# Patient Record
Sex: FEMALE | Race: WHITE | NOT HISPANIC OR LATINO | URBAN - METROPOLITAN AREA
[De-identification: names, ages, dates, MRNs, and addresses within clinical notes are randomized per-mention and may not be internally consistent; named-entity substitution may affect disease eponyms.]

---

## 2020-07-25 ENCOUNTER — TELEPHONE ENCOUNTER (OUTPATIENT)
Dept: URBAN - METROPOLITAN AREA CLINIC 13 | Facility: CLINIC | Age: 80
End: 2020-07-25

## 2020-07-25 RX ORDER — PANCRELIPASE 24000; 76000; 120000 [USP'U]/1; [USP'U]/1; [USP'U]/1
TAKE 1 CAPSULE 3 TIMES DAILY WITH MEALS CAPSULE, DELAYED RELEASE PELLETS ORAL
Qty: 90 | Refills: 5 | OUTPATIENT
Start: 2018-05-31 | End: 2018-07-02

## 2020-07-25 RX ORDER — MIRTAZAPINE 15 MG/1
TAKE 1 TABLET AT BEDTIME TABLET, FILM COATED ORAL
Qty: 30 | Refills: 6 | OUTPATIENT
Start: 2017-07-28 | End: 2018-02-19

## 2020-07-25 RX ORDER — DICYCLOMINE HYDROCHLORIDE 10 MG/1
TAKE 1 CAPSULE 3 TIMES DAILY PRN CAPSULE ORAL
Qty: 270 | Refills: 2 | OUTPATIENT
Start: 2019-04-03 | End: 2019-08-20

## 2020-07-25 RX ORDER — LINACLOTIDE 145 UG/1
TAKE 1 CAPSULE DAILY CAPSULE, GELATIN COATED ORAL
Qty: 30 | Refills: 2 | OUTPATIENT

## 2020-07-25 RX ORDER — METOCLOPRAMIDE 10 MG/1
TAKE 1 TABLET 30 MINUTES BEFORE MEALS AND AT BEDTIME TABLET ORAL
Qty: 120 | Refills: 2 | OUTPATIENT
Start: 2018-07-02 | End: 2018-08-15

## 2020-07-25 RX ORDER — LINACLOTIDE 145 UG/1
TAKE 1 CAPSULE DAILY EMPTY STOMACH, 30 MINUTES BEFORE BREAKFAST CAPSULE, GELATIN COATED ORAL
Qty: 30 | Refills: 5 | OUTPATIENT
Start: 2018-01-15 | End: 2018-02-19

## 2020-07-25 RX ORDER — ONDANSETRON 4 MG/1
1 TABLET ODT EVERY 4-6 HOURS AS NEEDED FOR NAUSEA TABLET, ORALLY DISINTEGRATING ORAL
Qty: 40 | Refills: 2 | OUTPATIENT
Start: 2018-07-02 | End: 2018-08-15

## 2020-07-25 RX ORDER — CARVEDILOL 3.12 MG/1
TAKE 1 TABLET TWICE DAILY WITH MEALS TABLET, FILM COATED ORAL
Qty: 180 | Refills: 3 | OUTPATIENT
End: 2019-02-13

## 2020-07-25 RX ORDER — POLYETHYLENE GLYCOL 3350, SODIUM CHLORIDE, SODIUM BICARBONATE AND POTASSIUM CHLORIDE WITH LEMON FLAVOR 420; 11.2; 5.72; 1.48 G/4L; G/4L; G/4L; G/4L
USE AS DIRECTED POWDER, FOR SOLUTION ORAL
Qty: 1 | Refills: 0 | OUTPATIENT
Start: 2017-09-26 | End: 2017-11-15

## 2020-07-25 RX ORDER — DIAZEPAM 5 MG/1
TAKE 1 TABLET OTHER PRN TAKE 1-2 TABLETS BY MOUTH AS NEEDED FOR PRE-TEST TABLET ORAL
Qty: 5 | Refills: 0 | OUTPATIENT
Start: 2018-03-13 | End: 2018-03-23

## 2020-07-25 RX ORDER — POLYETHYLENE GLYCOL 3350, SODIUM CHLORIDE, SODIUM BICARBONATE AND POTASSIUM CHLORIDE WITH LEMON FLAVOR 420; 11.2; 5.72; 1.48 G/4L; G/4L; G/4L; G/4L
TAKE 1/2 GALLON AT 5:00 PM DAY BEFORE PROCEDURE, TAKE SECOND 1/2 OF GALLON 6 HRS PRIOR TO PROCEDURE POWDER, FOR SOLUTION ORAL
Qty: 1 | Refills: 0 | OUTPATIENT
Start: 2018-05-30 | End: 2018-07-02

## 2020-07-25 RX ORDER — ASPIRIN 81 MG/1
TAKE 1 TABLET EVERY 2 DAYS TABLET, DELAYED RELEASE ORAL
Refills: 0 | OUTPATIENT
End: 2019-08-20

## 2020-07-25 RX ORDER — KETOCONAZOLE 20 MG/G
APPLY SPARINGLY TO AFFECTED AREA(S) ONCE DAILY CREAM TOPICAL
Refills: 0 | OUTPATIENT
Start: 2019-10-22 | End: 2019-12-06

## 2020-07-25 RX ORDER — PANTOPRAZOLE SODIUM 40 MG/1
TAKE ONE TABLET BY MOUTH ONE TIME DAILY 30 MINUTES BEFORE BREAKFAST TABLET, DELAYED RELEASE ORAL
Qty: 30 | Refills: 11 | OUTPATIENT
Start: 2018-07-02 | End: 2019-02-13

## 2020-07-25 RX ORDER — TRIAMCINOLONE ACETONIDE 1 MG/G
APPLY SPARINGLY TO AFFECTED AREA(S) 4 TIMES A DAY OINTMENT TOPICAL
Refills: 0 | OUTPATIENT
Start: 2019-04-10 | End: 2019-08-20

## 2020-07-25 RX ORDER — ATORVASTATIN CALCIUM 10 MG/1
TAKE 1 TABLET DAILY TABLET, FILM COATED ORAL
Refills: 0 | OUTPATIENT
End: 2019-12-05

## 2020-07-26 ENCOUNTER — TELEPHONE ENCOUNTER (OUTPATIENT)
Dept: URBAN - METROPOLITAN AREA CLINIC 13 | Facility: CLINIC | Age: 80
End: 2020-07-26

## 2020-07-26 RX ORDER — POLYETHYLENE GLYCOL-3350 AND ELECTROLYTES WITH FLAVOR PACK 240; 5.84; 2.98; 6.72; 22.72 G/278.26G; G/278.26G; G/278.26G; G/278.26G; G/278.26G
TAKE 1/2 GALLON AT 5 P.M. THE DAY BEFORE PROCEDURE, TAKE SECOND 1/2 GA POWDER, FOR SOLUTION ORAL
Qty: 4000 | Refills: 0 | Status: ACTIVE | COMMUNITY
Start: 2018-05-30

## 2020-07-26 RX ORDER — BRIMONIDINE TARTRATE, TIMOLOL MALEATE 2; 5 MG/ML; MG/ML
INSTILL 1 DROP TWICE DAILY SOLUTION/ DROPS OPHTHALMIC
Refills: 0 | Status: ACTIVE | COMMUNITY
Start: 2019-02-11

## 2020-07-26 RX ORDER — BETAMETHASONE VALERATE 1 MG/G
CREAM TOPICAL
Qty: 45 | Refills: 0 | Status: ACTIVE | COMMUNITY
Start: 2018-12-31

## 2020-07-26 RX ORDER — ATORVASTATIN CALCIUM 80 MG/1
TABLET, FILM COATED ORAL
Qty: 90 | Refills: 0 | Status: ACTIVE | COMMUNITY
Start: 2018-04-16

## 2020-07-26 RX ORDER — BIMATOPROST 0.1 MG/ML
SOLUTION/ DROPS OPHTHALMIC
Qty: 8 | Refills: 0 | Status: ACTIVE | COMMUNITY
Start: 2018-06-12

## 2020-07-26 RX ORDER — BUSPIRONE HYDROCHLORIDE 10 MG/1
TABLET ORAL
Qty: 180 | Refills: 0 | Status: ACTIVE | COMMUNITY
Start: 2018-10-04

## 2020-07-26 RX ORDER — LAMOTRIGINE 150 MG/1
TAKE 1 TABLET TWICE DAILY TABLET ORAL
Refills: 0 | Status: ACTIVE | COMMUNITY

## 2020-07-26 RX ORDER — PREDNISONE 10 MG/1
TAKE FIVE TABLETS BY MOUTH ONE TIME DAILY TABLET ORAL
Qty: 100 | Refills: 0 | Status: ACTIVE | COMMUNITY
Start: 2019-03-13

## 2020-07-26 RX ORDER — ALPRAZOLAM 0.25 MG/1
TABLET ORAL
Qty: 30 | Refills: 0 | Status: ACTIVE | COMMUNITY
Start: 2018-03-20

## 2020-07-26 RX ORDER — PANTOPRAZOLE SODIUM 40 MG/1
TAKE 1 TABLET DAILY PRN TABLET, DELAYED RELEASE ORAL
Refills: 3 | Status: ACTIVE | COMMUNITY

## 2020-07-26 RX ORDER — ATORVASTATIN CALCIUM 80 MG/1
TABLET, FILM COATED ORAL
Qty: 90 | Refills: 0 | Status: ACTIVE | COMMUNITY
Start: 2018-07-20

## 2020-07-26 RX ORDER — BUSPIRONE HYDROCHLORIDE 10 MG/1
TABLET ORAL
Qty: 180 | Refills: 0 | Status: ACTIVE | COMMUNITY
Start: 2019-01-18

## 2020-07-26 RX ORDER — CEPHALEXIN 250 MG/1
TAKE ONE CAPSULE BY MOUTH TWICE A DAY CAPSULE ORAL
Qty: 6 | Refills: 0 | Status: ACTIVE | COMMUNITY
Start: 2018-07-03

## 2020-07-26 RX ORDER — IBUPROFEN 800 MG
TAKE 1 TABLET DAILY TABLET ORAL
Refills: 0 | Status: ACTIVE | COMMUNITY

## 2020-07-26 RX ORDER — BIMATOPROST 0.1 MG/ML
SOLUTION/ DROPS OPHTHALMIC
Qty: 8 | Refills: 0 | Status: ACTIVE | COMMUNITY
Start: 2018-02-21

## 2020-07-26 RX ORDER — BIMATOPROST 0.1 MG/ML
INSTILL ONE DROP INTO EACH EYE AT BEDTIME SOLUTION/ DROPS OPHTHALMIC
Qty: 8 | Refills: 0 | Status: ACTIVE | COMMUNITY
Start: 2018-01-08

## 2020-07-26 RX ORDER — BUSPIRONE HYDROCHLORIDE 10 MG/1
TABLET ORAL
Qty: 180 | Refills: 0 | Status: ACTIVE | COMMUNITY
Start: 2019-05-09

## 2020-07-26 RX ORDER — ONDANSETRON 4 MG/1
1 TABLET ODT EVERY 4-6 HOURS AS NEEDED FOR NAUSEA TABLET, ORALLY DISINTEGRATING ORAL
Qty: 40 | Refills: 2 | Status: ACTIVE | COMMUNITY
Start: 2019-05-13

## 2020-07-26 RX ORDER — ONDANSETRON HYDROCHLORIDE 4 MG/1
TABLET, FILM COATED ORAL
Qty: 120 | Refills: 0 | Status: ACTIVE | COMMUNITY
Start: 2018-03-16

## 2020-07-26 RX ORDER — LATANOPROSTENE BUNOD 0.24 MG/ML
INSTILL A DROP IN EACH EYE AT BEDTIME SOLUTION/ DROPS OPHTHALMIC
Qty: 8 | Refills: 0 | Status: ACTIVE | COMMUNITY
Start: 2019-05-28

## 2020-07-26 RX ORDER — ATORVASTATIN CALCIUM 80 MG/1
TAKE 1 TABLET DAILY TABLET, FILM COATED ORAL
Refills: 0 | Status: ACTIVE | COMMUNITY
Start: 2019-01-03

## 2020-07-26 RX ORDER — BRIMONIDINE TARTRATE, TIMOLOL MALEATE 2; 5 MG/ML; MG/ML
SOLUTION/ DROPS OPHTHALMIC
Qty: 15 | Refills: 0 | Status: ACTIVE | COMMUNITY
Start: 2018-03-17

## 2020-07-26 RX ORDER — ATORVASTATIN CALCIUM 80 MG/1
TAKE ONE TABLET BY MOUTH ONE TIME DAILY TABLET, FILM COATED ORAL
Qty: 90 | Refills: 0 | Status: ACTIVE | COMMUNITY
Start: 2017-12-18

## 2020-07-26 RX ORDER — BETAMETHASONE VALERATE 1 MG/G
CREAM TOPICAL
Qty: 45 | Refills: 0 | Status: ACTIVE | COMMUNITY
Start: 2018-10-24

## 2020-07-26 RX ORDER — BUSPIRONE HYDROCHLORIDE 10 MG/1
TABLET ORAL
Qty: 180 | Refills: 0 | Status: ACTIVE | COMMUNITY
Start: 2018-02-23

## 2020-08-21 ENCOUNTER — OFFICE VISIT (OUTPATIENT)
Dept: URBAN - METROPOLITAN AREA CLINIC 72 | Facility: CLINIC | Age: 80
End: 2020-08-21
Payer: MEDICARE

## 2020-08-21 ENCOUNTER — WEB ENCOUNTER (OUTPATIENT)
Dept: URBAN - METROPOLITAN AREA CLINIC 72 | Facility: CLINIC | Age: 80
End: 2020-08-21

## 2020-08-21 VITALS
DIASTOLIC BLOOD PRESSURE: 78 MMHG | HEART RATE: 80 BPM | SYSTOLIC BLOOD PRESSURE: 157 MMHG | WEIGHT: 119 LBS | HEIGHT: 63 IN | TEMPERATURE: 98.4 F | BODY MASS INDEX: 21.09 KG/M2

## 2020-08-21 DIAGNOSIS — K58.2 IRRITABLE BOWEL SYNDROME WITH BOTH CONSTIPATION AND DIARRHEA: ICD-10-CM

## 2020-08-21 PROCEDURE — 1036F TOBACCO NON-USER: CPT | Performed by: INTERNAL MEDICINE

## 2020-08-21 PROCEDURE — 99213 OFFICE O/P EST LOW 20 MIN: CPT | Performed by: INTERNAL MEDICINE

## 2020-08-21 PROCEDURE — G8427 DOCREV CUR MEDS BY ELIG CLIN: HCPCS | Performed by: INTERNAL MEDICINE

## 2020-08-21 PROCEDURE — G8418 CALC BMI BLW LOW PARAM F/U: HCPCS | Performed by: INTERNAL MEDICINE

## 2020-08-21 PROCEDURE — G9903 PT SCRN TBCO ID AS NON USER: HCPCS | Performed by: INTERNAL MEDICINE

## 2020-08-21 RX ORDER — LAMOTRIGINE 150 MG/1
TAKE 1 TABLET TWICE DAILY TABLET ORAL
Refills: 0 | Status: ACTIVE | COMMUNITY

## 2020-08-21 RX ORDER — PREDNISONE 10 MG/1
TAKE FIVE TABLETS BY MOUTH ONE TIME DAILY TABLET ORAL
Qty: 100 | Refills: 0 | Status: ACTIVE | COMMUNITY
Start: 2019-03-13

## 2020-08-21 RX ORDER — BRIMONIDINE TARTRATE, TIMOLOL MALEATE 2; 5 MG/ML; MG/ML
SOLUTION/ DROPS OPHTHALMIC
Qty: 15 | Refills: 0 | Status: ACTIVE | COMMUNITY
Start: 2018-03-17

## 2020-08-21 RX ORDER — ONDANSETRON HYDROCHLORIDE 4 MG/1
TABLET, FILM COATED ORAL
Qty: 120 | Refills: 0 | Status: ACTIVE | COMMUNITY
Start: 2018-03-16

## 2020-08-21 RX ORDER — BETAMETHASONE VALERATE 1 MG/G
CREAM TOPICAL
Qty: 45 | Refills: 0 | Status: ACTIVE | COMMUNITY
Start: 2018-10-24

## 2020-08-21 RX ORDER — CEPHALEXIN 250 MG/1
TAKE ONE CAPSULE BY MOUTH TWICE A DAY CAPSULE ORAL
Qty: 6 | Refills: 0 | Status: ACTIVE | COMMUNITY
Start: 2018-07-03

## 2020-08-21 RX ORDER — BIMATOPROST 0.1 MG/ML
INSTILL ONE DROP INTO EACH EYE AT BEDTIME SOLUTION/ DROPS OPHTHALMIC
Qty: 8 | Refills: 0 | Status: ACTIVE | COMMUNITY
Start: 2018-01-08

## 2020-08-21 RX ORDER — LATANOPROSTENE BUNOD 0.24 MG/ML
INSTILL A DROP IN EACH EYE AT BEDTIME SOLUTION/ DROPS OPHTHALMIC
Qty: 8 | Refills: 0 | Status: ACTIVE | COMMUNITY
Start: 2019-05-28

## 2020-08-21 RX ORDER — PANTOPRAZOLE SODIUM 40 MG/1
TAKE 1 TABLET DAILY PRN TABLET, DELAYED RELEASE ORAL
Refills: 3 | Status: ACTIVE | COMMUNITY

## 2020-08-21 RX ORDER — IBUPROFEN 800 MG
TAKE 1 TABLET DAILY TABLET ORAL
Refills: 0 | Status: ACTIVE | COMMUNITY

## 2020-08-21 RX ORDER — ATORVASTATIN CALCIUM 80 MG/1
TAKE ONE TABLET BY MOUTH ONE TIME DAILY TABLET, FILM COATED ORAL
Qty: 90 | Refills: 0 | Status: ACTIVE | COMMUNITY
Start: 2017-12-18

## 2020-08-21 RX ORDER — ONDANSETRON 4 MG/1
1 TABLET ODT EVERY 4-6 HOURS AS NEEDED FOR NAUSEA TABLET, ORALLY DISINTEGRATING ORAL
Qty: 40 | Refills: 2 | Status: ACTIVE | COMMUNITY
Start: 2019-05-13

## 2020-08-21 RX ORDER — ALPRAZOLAM 0.25 MG/1
TABLET ORAL
Qty: 30 | Refills: 0 | Status: ACTIVE | COMMUNITY
Start: 2018-03-20

## 2020-08-21 RX ORDER — BUSPIRONE HYDROCHLORIDE 10 MG/1
TABLET ORAL
Qty: 180 | Refills: 0 | Status: ACTIVE | COMMUNITY
Start: 2018-02-23

## 2020-08-21 RX ORDER — POLYETHYLENE GLYCOL-3350 AND ELECTROLYTES WITH FLAVOR PACK 240; 5.84; 2.98; 6.72; 22.72 G/278.26G; G/278.26G; G/278.26G; G/278.26G; G/278.26G
TAKE 1/2 GALLON AT 5 P.M. THE DAY BEFORE PROCEDURE, TAKE SECOND 1/2 GA POWDER, FOR SOLUTION ORAL
Qty: 4000 | Refills: 0 | Status: ACTIVE | COMMUNITY
Start: 2018-05-30

## 2020-08-21 NOTE — HPI-TODAY'S VISIT:
Ms. Kimball returns for follow-up.  Recall she is an 80-year-old female who we have been seeing for irritable bowel syndrome.  Her past medical history is notable for irritable bowel syndrome with both constipation and diarrhea as well as functional dyspepsia, anemia, squamous cell carcinoma on chemotherapy with Dr. Gong. She was last seen in our office December 6, 2019.  We placed her on a low fermentable carbohydrates in diet to help with symptom management.  Last visit she endorsed bloating and diarrhea in relation to dairy and fatty foods.  She was utilizing MiraLAX as needed for her episodes of constipation.  She also was taking Zofran as needed for nausea.  We advised that she reintroduce some of the food she had been omitting from her diet and also recommended a six-month follow-up.  She now returns.  She is still having issues with nausea, but the zofran is helping

## 2020-09-02 ENCOUNTER — TELEPHONE ENCOUNTER (OUTPATIENT)
Dept: URBAN - METROPOLITAN AREA CLINIC 72 | Facility: CLINIC | Age: 80
End: 2020-09-02

## 2021-01-12 ENCOUNTER — OFFICE VISIT (OUTPATIENT)
Dept: URBAN - METROPOLITAN AREA CLINIC 72 | Facility: CLINIC | Age: 81
End: 2021-01-12
Payer: MEDICARE

## 2021-01-12 ENCOUNTER — WEB ENCOUNTER (OUTPATIENT)
Dept: URBAN - METROPOLITAN AREA CLINIC 72 | Facility: CLINIC | Age: 81
End: 2021-01-12

## 2021-01-12 VITALS
DIASTOLIC BLOOD PRESSURE: 71 MMHG | BODY MASS INDEX: 20.91 KG/M2 | SYSTOLIC BLOOD PRESSURE: 158 MMHG | HEART RATE: 59 BPM | WEIGHT: 118 LBS | TEMPERATURE: 98.4 F | HEIGHT: 63 IN

## 2021-01-12 DIAGNOSIS — K58.2 IRRITABLE BOWEL SYNDROME WITH BOTH CONSTIPATION AND DIARRHEA: ICD-10-CM

## 2021-01-12 PROCEDURE — G8482 FLU IMMUNIZE ORDER/ADMIN: HCPCS | Performed by: INTERNAL MEDICINE

## 2021-01-12 PROCEDURE — G8427 DOCREV CUR MEDS BY ELIG CLIN: HCPCS | Performed by: INTERNAL MEDICINE

## 2021-01-12 PROCEDURE — 1036F TOBACCO NON-USER: CPT | Performed by: INTERNAL MEDICINE

## 2021-01-12 PROCEDURE — G8420 CALC BMI NORM PARAMETERS: HCPCS | Performed by: INTERNAL MEDICINE

## 2021-01-12 PROCEDURE — 99213 OFFICE O/P EST LOW 20 MIN: CPT | Performed by: INTERNAL MEDICINE

## 2021-01-12 RX ORDER — BIMATOPROST 0.1 MG/ML
INSTILL ONE DROP INTO EACH EYE AT BEDTIME SOLUTION/ DROPS OPHTHALMIC
Qty: 8 | Refills: 0 | Status: ON HOLD | COMMUNITY
Start: 2018-01-08

## 2021-01-12 RX ORDER — ONDANSETRON HYDROCHLORIDE 4 MG/1
TABLET, FILM COATED ORAL
Qty: 120 | Refills: 0 | Status: ACTIVE | COMMUNITY
Start: 2018-03-16

## 2021-01-12 RX ORDER — IBUPROFEN 800 MG
TAKE 1 TABLET DAILY TABLET ORAL
Refills: 0 | Status: ACTIVE | COMMUNITY

## 2021-01-12 RX ORDER — GINGER ROOT/GINGER ROOT EXT 262.5 MG
1 TABLET WITH A MEAL CAPSULE ORAL ONCE A DAY
Status: ACTIVE | COMMUNITY

## 2021-01-12 RX ORDER — POLYETHYLENE GLYCOL-3350 AND ELECTROLYTES WITH FLAVOR PACK 240; 5.84; 2.98; 6.72; 22.72 G/278.26G; G/278.26G; G/278.26G; G/278.26G; G/278.26G
TAKE 1/2 GALLON AT 5 P.M. THE DAY BEFORE PROCEDURE, TAKE SECOND 1/2 GA POWDER, FOR SOLUTION ORAL
Qty: 4000 | Refills: 0 | Status: ON HOLD | COMMUNITY
Start: 2018-05-30

## 2021-01-12 RX ORDER — BRIMONIDINE TARTRATE, TIMOLOL MALEATE 2; 5 MG/ML; MG/ML
SOLUTION/ DROPS OPHTHALMIC
Qty: 15 | Refills: 0 | Status: ACTIVE | COMMUNITY
Start: 2018-03-17

## 2021-01-12 RX ORDER — BETAMETHASONE VALERATE 1 MG/G
CREAM TOPICAL
Qty: 45 | Refills: 0 | Status: ACTIVE | COMMUNITY
Start: 2018-10-24

## 2021-01-12 RX ORDER — ALPRAZOLAM 0.25 MG/1
TABLET ORAL
Qty: 30 | Refills: 0 | Status: ON HOLD | COMMUNITY
Start: 2018-03-20

## 2021-01-12 RX ORDER — PREDNISONE 10 MG/1
TAKE FIVE TABLETS BY MOUTH ONE TIME DAILY TABLET ORAL
Qty: 100 | Refills: 0 | Status: ON HOLD | COMMUNITY
Start: 2019-03-13

## 2021-01-12 RX ORDER — LAMOTRIGINE 150 MG/1
TAKE 1 TABLET TWICE DAILY TABLET ORAL
Refills: 0 | Status: ACTIVE | COMMUNITY

## 2021-01-12 RX ORDER — CEPHALEXIN 250 MG/1
TAKE ONE CAPSULE BY MOUTH TWICE A DAY CAPSULE ORAL
Qty: 6 | Refills: 0 | Status: ON HOLD | COMMUNITY
Start: 2018-07-03

## 2021-01-12 RX ORDER — ONDANSETRON 4 MG/1
1 TABLET ODT EVERY 4-6 HOURS AS NEEDED FOR NAUSEA TABLET, ORALLY DISINTEGRATING ORAL
Qty: 40 | Refills: 2 | Status: ON HOLD | COMMUNITY
Start: 2019-05-13

## 2021-01-12 RX ORDER — BUSPIRONE HYDROCHLORIDE 10 MG/1
TABLET ORAL
Qty: 180 | Refills: 0 | Status: ACTIVE | COMMUNITY
Start: 2018-02-23

## 2021-01-12 RX ORDER — CALCIUM CARBONATE/VITAMIN D3 600 MG-10
1 CAPSULE TABLET ORAL ONCE A DAY
Status: ACTIVE | COMMUNITY

## 2021-01-12 RX ORDER — LATANOPROSTENE BUNOD 0.24 MG/ML
INSTILL A DROP IN EACH EYE AT BEDTIME SOLUTION/ DROPS OPHTHALMIC
Qty: 8 | Refills: 0 | Status: ACTIVE | COMMUNITY
Start: 2019-05-28

## 2021-01-12 RX ORDER — PANTOPRAZOLE SODIUM 40 MG/1
TAKE 1 TABLET DAILY PRN TABLET, DELAYED RELEASE ORAL
Refills: 3 | Status: ON HOLD | COMMUNITY

## 2021-01-12 RX ORDER — ATORVASTATIN CALCIUM 80 MG/1
TAKE ONE TABLET BY MOUTH ONE TIME DAILY TABLET, FILM COATED ORAL
Qty: 90 | Refills: 0 | Status: ON HOLD | COMMUNITY
Start: 2017-12-18

## 2021-01-12 NOTE — HPI-TODAY'S VISIT:
Ms. Kimball returns for follow-up.  Recall she is a pleasant 80-year-old female last seen her office on 8/21/2020 for follow-up of irritable bowel syndrome.  She has a history of irritable bowel syndrome with both diarrhea and constipation.  Functional dyspepsia, anemia, squamous cell carcinoma managed by Dr. Hanks.   She has tried the low fodmaps diet.  Symptom management and noted that she would feel bloating in relation to dairy and fatty foods.   she is slowly advancing her diet, she is still having alternating bowel habits relates this to dietary intake.  She has no weight loss and feels well today.  She is trying to  take in more protein.

## 2021-03-04 ENCOUNTER — WEB ENCOUNTER (OUTPATIENT)
Dept: URBAN - METROPOLITAN AREA CLINIC 72 | Facility: CLINIC | Age: 81
End: 2021-03-04

## 2021-03-04 ENCOUNTER — OFFICE VISIT (OUTPATIENT)
Dept: URBAN - METROPOLITAN AREA CLINIC 72 | Facility: CLINIC | Age: 81
End: 2021-03-04
Payer: MEDICARE

## 2021-03-04 VITALS
TEMPERATURE: 98.7 F | WEIGHT: 115 LBS | HEIGHT: 63 IN | SYSTOLIC BLOOD PRESSURE: 150 MMHG | DIASTOLIC BLOOD PRESSURE: 75 MMHG | HEART RATE: 64 BPM | BODY MASS INDEX: 20.38 KG/M2

## 2021-03-04 DIAGNOSIS — K58.2 IRRITABLE BOWEL SYNDROME WITH BOTH CONSTIPATION AND DIARRHEA: ICD-10-CM

## 2021-03-04 PROCEDURE — 99213 OFFICE O/P EST LOW 20 MIN: CPT | Performed by: INTERNAL MEDICINE

## 2021-03-04 RX ORDER — GINGER ROOT/GINGER ROOT EXT 262.5 MG
1 TABLET WITH A MEAL CAPSULE ORAL ONCE A DAY
Status: ACTIVE | COMMUNITY

## 2021-03-04 RX ORDER — BETAMETHASONE VALERATE 1 MG/G
CREAM TOPICAL
Qty: 45 | Refills: 0 | Status: ACTIVE | COMMUNITY
Start: 2018-10-24

## 2021-03-04 RX ORDER — CEPHALEXIN 250 MG/1
TAKE ONE CAPSULE BY MOUTH TWICE A DAY CAPSULE ORAL
Qty: 6 | Refills: 0 | Status: ON HOLD | COMMUNITY
Start: 2018-07-03

## 2021-03-04 RX ORDER — POLYETHYLENE GLYCOL-3350 AND ELECTROLYTES WITH FLAVOR PACK 240; 5.84; 2.98; 6.72; 22.72 G/278.26G; G/278.26G; G/278.26G; G/278.26G; G/278.26G
TAKE 1/2 GALLON AT 5 P.M. THE DAY BEFORE PROCEDURE, TAKE SECOND 1/2 GA POWDER, FOR SOLUTION ORAL
Qty: 4000 | Refills: 0 | Status: ON HOLD | COMMUNITY
Start: 2018-05-30

## 2021-03-04 RX ORDER — LATANOPROSTENE BUNOD 0.24 MG/ML
INSTILL A DROP IN EACH EYE AT BEDTIME SOLUTION/ DROPS OPHTHALMIC
Qty: 8 | Refills: 0 | Status: ACTIVE | COMMUNITY
Start: 2019-05-28

## 2021-03-04 RX ORDER — LAMOTRIGINE 150 MG/1
TAKE 1 TABLET TWICE DAILY TABLET ORAL
Refills: 0 | Status: ACTIVE | COMMUNITY

## 2021-03-04 RX ORDER — PREDNISONE 10 MG/1
TAKE FIVE TABLETS BY MOUTH ONE TIME DAILY TABLET ORAL
Qty: 100 | Refills: 0 | Status: ON HOLD | COMMUNITY
Start: 2019-03-13

## 2021-03-04 RX ORDER — ONDANSETRON 4 MG/1
1 TABLET ODT EVERY 4-6 HOURS AS NEEDED FOR NAUSEA TABLET, ORALLY DISINTEGRATING ORAL
Qty: 40 | Refills: 2 | Status: ON HOLD | COMMUNITY
Start: 2019-05-13

## 2021-03-04 RX ORDER — ONDANSETRON HYDROCHLORIDE 4 MG/1
TABLET, FILM COATED ORAL
Qty: 120 | Refills: 0 | Status: ACTIVE | COMMUNITY
Start: 2018-03-16

## 2021-03-04 RX ORDER — CALCIUM CARBONATE/VITAMIN D3 600 MG-10
1 CAPSULE TABLET ORAL ONCE A DAY
Status: ACTIVE | COMMUNITY

## 2021-03-04 RX ORDER — ATORVASTATIN CALCIUM 80 MG/1
TAKE ONE TABLET BY MOUTH ONE TIME DAILY TABLET, FILM COATED ORAL
Qty: 90 | Refills: 0 | Status: ON HOLD | COMMUNITY
Start: 2017-12-18

## 2021-03-04 RX ORDER — BUSPIRONE HYDROCHLORIDE 10 MG/1
TABLET ORAL
Qty: 180 | Refills: 0 | Status: ACTIVE | COMMUNITY
Start: 2018-02-23

## 2021-03-04 RX ORDER — PANTOPRAZOLE SODIUM 40 MG/1
TAKE 1 TABLET DAILY PRN TABLET, DELAYED RELEASE ORAL
Refills: 3 | Status: ON HOLD | COMMUNITY

## 2021-03-04 RX ORDER — ALPRAZOLAM 0.25 MG/1
TABLET ORAL
Qty: 30 | Refills: 0 | Status: ON HOLD | COMMUNITY
Start: 2018-03-20

## 2021-03-04 RX ORDER — BIMATOPROST 0.1 MG/ML
INSTILL ONE DROP INTO EACH EYE AT BEDTIME SOLUTION/ DROPS OPHTHALMIC
Qty: 8 | Refills: 0 | Status: ON HOLD | COMMUNITY
Start: 2018-01-08

## 2021-03-04 RX ORDER — BRIMONIDINE TARTRATE, TIMOLOL MALEATE 2; 5 MG/ML; MG/ML
SOLUTION/ DROPS OPHTHALMIC
Qty: 15 | Refills: 0 | Status: ACTIVE | COMMUNITY
Start: 2018-03-17

## 2021-03-04 RX ORDER — IBUPROFEN 800 MG
TAKE 1 TABLET DAILY TABLET ORAL
Refills: 0 | Status: ACTIVE | COMMUNITY

## 2021-03-04 NOTE — HPI-TODAY'S VISIT:
Ms. Kimball returns for follow-up.  She was last seen in our office on 1/12/2021.  She is an 80-year-old female who we have been following for irritable bowel syndrome.  Her irritable bowel syndrome as defined by both diarrhea and constipation.  In addition she has history of functional dyspepsia, anemia, and squamous cell carcinoma which is managed by Dr. Hanks.   She is placed on the Low Fodmaps diet for symptom management and she felt that she had bloating in relation to dairy and fatty foods.  We advised slowly advancing her diet with reintroduction of the previously eliminated foods.  In addition she was working on taking in more protein.  We last saw her she was still working through food [employer] but was doing well overall.  She denied weight loss and had no complaints.  We advised a 6 month follow-up.   she returns today with ongoing issues regarding her irritable bowel syndrome.  She had one month's worth of diarrhea, notes that she is still using MiraLAX intermittently during this time.  She finally took some Imodium and then developed constipation, once constipation set and she took some MiraLAX and was back to having diarrhea.  she has had yearly bowel syndrome for many years and still is learning how to deal with her ever changing symptoms.  We discussed that her medications are likely contribute to some of her symptoms and the importance of not overmedicating.  She is trying to still work through the low fermentable carbohydrates diet.  No weight loss, bowels have returned to normal, no abdominal pain no blood in her stool.

## 2021-07-13 ENCOUNTER — DASHBOARD ENCOUNTERS (OUTPATIENT)
Age: 81
End: 2021-07-13

## 2021-07-13 ENCOUNTER — OFFICE VISIT (OUTPATIENT)
Dept: URBAN - METROPOLITAN AREA CLINIC 72 | Facility: CLINIC | Age: 81
End: 2021-07-13
Payer: MEDICARE

## 2021-07-13 VITALS
SYSTOLIC BLOOD PRESSURE: 113 MMHG | WEIGHT: 119 LBS | BODY MASS INDEX: 21.09 KG/M2 | TEMPERATURE: 98 F | HEART RATE: 62 BPM | DIASTOLIC BLOOD PRESSURE: 72 MMHG | HEIGHT: 63 IN | RESPIRATION RATE: 18 BRPM

## 2021-07-13 DIAGNOSIS — R11.0 NAUSEA: ICD-10-CM

## 2021-07-13 DIAGNOSIS — K58.2 IRRITABLE BOWEL SYNDROME WITH BOTH CONSTIPATION AND DIARRHEA: ICD-10-CM

## 2021-07-13 PROBLEM — 10743008: Status: ACTIVE | Noted: 2021-01-12

## 2021-07-13 PROCEDURE — 99214 OFFICE O/P EST MOD 30 MIN: CPT | Performed by: INTERNAL MEDICINE

## 2021-07-13 RX ORDER — IBUPROFEN 800 MG
TAKE 1 TABLET DAILY TABLET ORAL
Refills: 0 | Status: ACTIVE | COMMUNITY

## 2021-07-13 RX ORDER — GINGER ROOT/GINGER ROOT EXT 262.5 MG
1 TABLET WITH A MEAL CAPSULE ORAL ONCE A DAY
Status: ACTIVE | COMMUNITY

## 2021-07-13 RX ORDER — BRIMONIDINE TARTRATE, TIMOLOL MALEATE 2; 5 MG/ML; MG/ML
SOLUTION/ DROPS OPHTHALMIC
Qty: 15 | Refills: 0 | Status: ACTIVE | COMMUNITY
Start: 2018-03-17

## 2021-07-13 RX ORDER — CALCIUM CARBONATE/VITAMIN D3 600 MG-10
1 CAPSULE TABLET ORAL ONCE A DAY
Status: ACTIVE | COMMUNITY

## 2021-07-13 RX ORDER — ALPRAZOLAM 0.25 MG/1
TABLET ORAL
Qty: 30 | Refills: 0 | Status: ON HOLD | COMMUNITY
Start: 2018-03-20

## 2021-07-13 RX ORDER — LATANOPROSTENE BUNOD 0.24 MG/ML
INSTILL A DROP IN EACH EYE AT BEDTIME SOLUTION/ DROPS OPHTHALMIC
Qty: 8 | Refills: 0 | Status: ACTIVE | COMMUNITY
Start: 2019-05-28

## 2021-07-13 RX ORDER — BETAMETHASONE VALERATE 1 MG/G
CREAM TOPICAL
Qty: 45 | Refills: 0 | Status: ACTIVE | COMMUNITY
Start: 2018-10-24

## 2021-07-13 RX ORDER — PANTOPRAZOLE SODIUM 40 MG/1
TAKE 1 TABLET DAILY PRN TABLET, DELAYED RELEASE ORAL
Refills: 3 | Status: ON HOLD | COMMUNITY

## 2021-07-13 RX ORDER — BUSPIRONE HYDROCHLORIDE 10 MG/1
TABLET ORAL
Qty: 180 | Refills: 0 | Status: ACTIVE | COMMUNITY
Start: 2018-02-23

## 2021-07-13 RX ORDER — POLYETHYLENE GLYCOL-3350 AND ELECTROLYTES WITH FLAVOR PACK 240; 5.84; 2.98; 6.72; 22.72 G/278.26G; G/278.26G; G/278.26G; G/278.26G; G/278.26G
TAKE 1/2 GALLON AT 5 P.M. THE DAY BEFORE PROCEDURE, TAKE SECOND 1/2 GA POWDER, FOR SOLUTION ORAL
Qty: 4000 | Refills: 0 | Status: ON HOLD | COMMUNITY
Start: 2018-05-30

## 2021-07-13 RX ORDER — PREDNISONE 10 MG/1
TAKE FIVE TABLETS BY MOUTH ONE TIME DAILY TABLET ORAL
Qty: 100 | Refills: 0 | Status: ON HOLD | COMMUNITY
Start: 2019-03-13

## 2021-07-13 RX ORDER — ONDANSETRON HYDROCHLORIDE 4 MG/1
TABLET, FILM COATED ORAL
Qty: 120 | Refills: 0
Start: 2018-03-16

## 2021-07-13 RX ORDER — CEPHALEXIN 250 MG/1
TAKE ONE CAPSULE BY MOUTH TWICE A DAY CAPSULE ORAL
Qty: 6 | Refills: 0 | Status: ON HOLD | COMMUNITY
Start: 2018-07-03

## 2021-07-13 RX ORDER — ONDANSETRON HYDROCHLORIDE 4 MG/1
TABLET, FILM COATED ORAL
Qty: 120 | Refills: 0 | Status: ACTIVE | COMMUNITY
Start: 2018-03-16

## 2021-07-13 RX ORDER — BIMATOPROST 0.1 MG/ML
INSTILL ONE DROP INTO EACH EYE AT BEDTIME SOLUTION/ DROPS OPHTHALMIC
Qty: 8 | Refills: 0 | Status: ON HOLD | COMMUNITY
Start: 2018-01-08

## 2021-07-13 RX ORDER — ONDANSETRON 4 MG/1
1 TABLET ODT EVERY 4-6 HOURS AS NEEDED FOR NAUSEA TABLET, ORALLY DISINTEGRATING ORAL
Qty: 40 | Refills: 2 | Status: ON HOLD | COMMUNITY
Start: 2019-05-13

## 2021-07-13 RX ORDER — ATORVASTATIN CALCIUM 80 MG/1
TAKE ONE TABLET BY MOUTH ONE TIME DAILY TABLET, FILM COATED ORAL
Qty: 90 | Refills: 0 | Status: ON HOLD | COMMUNITY
Start: 2017-12-18

## 2021-07-13 RX ORDER — LAMOTRIGINE 150 MG/1
TAKE 1 TABLET TWICE DAILY TABLET ORAL
Refills: 0 | Status: ACTIVE | COMMUNITY

## 2021-07-13 NOTE — HPI-TODAY'S VISIT:
Mrs. Kimball returns for follow-up.  She is a 81-year-old female last seen in our office on 3/4/2021.  We've been following her for irritable bowel syndrome with alternating bowel habits with diarrhea and constipation.  She has tried lifestyle dietary modifications as well as using MiraLAX.  when I last saw her she was having issues with overmedication of MiraLAX and Imodium.   she is using Benefiber and MiraLAX now, rarely takes Imodium.  she still has occasional diarrhea but is trying not to use Imodium as much.  She is making some lifestyle and dietary changes that also helps her.  She does note occasional issues with acid indigestion, this responds to over-the-counter treatment.  She asks for refill of her Zofran